# Patient Record
Sex: MALE | Race: WHITE | Employment: FULL TIME | URBAN - METROPOLITAN AREA
[De-identification: names, ages, dates, MRNs, and addresses within clinical notes are randomized per-mention and may not be internally consistent; named-entity substitution may affect disease eponyms.]

---

## 2023-12-23 ENCOUNTER — HOSPITAL ENCOUNTER (EMERGENCY)
Age: 58
Discharge: HOME | End: 2023-12-23
Payer: COMMERCIAL

## 2023-12-23 VITALS
HEIGHT: 69 IN | SYSTOLIC BLOOD PRESSURE: 147 MMHG | HEART RATE: 71 BPM | OXYGEN SATURATION: 98 % | DIASTOLIC BLOOD PRESSURE: 97 MMHG | TEMPERATURE: 98.2 F | BODY MASS INDEX: 41.47 KG/M2 | WEIGHT: 280 LBS | RESPIRATION RATE: 20 BRPM

## 2023-12-23 DIAGNOSIS — S61.219A LACERATION OF FINGER OF LEFT HAND WITHOUT FOREIGN BODY WITHOUT DAMAGE TO NAIL, UNSPECIFIED FINGER, INITIAL ENCOUNTER: Primary | ICD-10-CM

## 2023-12-23 PROCEDURE — 99282 EMERGENCY DEPT VISIT SF MDM: CPT

## 2023-12-23 PROCEDURE — 12001 RPR S/N/AX/GEN/TRNK 2.5CM/<: CPT

## 2023-12-25 NOTE — ED PROVIDER NOTES
Patient was given the following medications:  Medications - No data to display          Is this patient to be included in the SEP-1 Core Measure due to severe sepsis or septic shock? No   Exclusion criteria - the patient is NOT to be included for SEP-1 Core Measure due to: Infection is not suspected    Chronic Conditions affecting care:    has no past medical history on file. CONSULTS: (Who and What was discussed)  None      Social Determinants Significantly Affecting Health : None    Records Reviewed (External and Source)    CC/HPI Summary, DDx, ED Course, and Reassessment: Patient presented to the emerged from today with complaints of a laceration, patient with a small laceration to the distal tip of the left index finger. Patient laceration was cleansed, closed using Dermabond, see procedure. Patient instructed monitor for signs infection. He was discharged in good condition, he is up-to-date on tetanus. I am the Primary Clinician of Record. FINAL IMPRESSION      1. Laceration of finger of left hand without foreign body without damage to nail, unspecified finger, initial encounter          DISPOSITION/PLAN     DISPOSITION Decision to Discharge    PATIENT REFERRED TO:  Cam Tineo MD  [address] [place] [id number] [phone]    Call   For follow up      DISCHARGE MEDICATIONS:  There are no discharge medications for this patient. DISCONTINUED MEDICATIONS:  There are no discharge medications for this patient.              (Please note that portions of this note were completed with a voice recognition program.  Efforts were made to edit the dictations but occasionally words are mis-transcribed.)    DEBBIE Anthony CNP (electronically signed)       DEBBIE Anthony CNP  12/24/23 [id number]